# Patient Record
Sex: MALE | Race: WHITE | NOT HISPANIC OR LATINO | RURAL
[De-identification: names, ages, dates, MRNs, and addresses within clinical notes are randomized per-mention and may not be internally consistent; named-entity substitution may affect disease eponyms.]

---

## 2024-01-25 ENCOUNTER — HOSPITAL ENCOUNTER (EMERGENCY)
Facility: HOSPITAL | Age: 24
Discharge: HOME | End: 2024-01-25

## 2024-01-25 VITALS
HEIGHT: 70 IN | RESPIRATION RATE: 18 BRPM | WEIGHT: 159.5 LBS | DIASTOLIC BLOOD PRESSURE: 68 MMHG | SYSTOLIC BLOOD PRESSURE: 106 MMHG | BODY MASS INDEX: 22.83 KG/M2 | HEART RATE: 97 BPM | OXYGEN SATURATION: 97 % | TEMPERATURE: 102 F

## 2024-01-25 DIAGNOSIS — U07.1 COVID-19: Primary | ICD-10-CM

## 2024-01-25 LAB
FLUAV AG UPPER RESP QL IA.RAPID: NEGATIVE
FLUBV AG UPPER RESP QL IA.RAPID: NEGATIVE
SARS-COV-2 RDRP RESP QL NAA+PROBE: POSITIVE

## 2024-01-25 PROCEDURE — 99283 EMERGENCY DEPT VISIT LOW MDM: CPT | Mod: ,,, | Performed by: NURSE PRACTITIONER

## 2024-01-25 PROCEDURE — 99282 EMERGENCY DEPT VISIT SF MDM: CPT

## 2024-01-25 PROCEDURE — 87804 INFLUENZA ASSAY W/OPTIC: CPT | Performed by: NURSE PRACTITIONER

## 2024-01-25 PROCEDURE — 87635 SARS-COV-2 COVID-19 AMP PRB: CPT | Performed by: NURSE PRACTITIONER

## 2024-01-25 NOTE — ED TRIAGE NOTES
Presents to ER with c/o fever and body aches. Pt states that he woke up with a temp of 101.7 around 1500, took two motrin and went to work. Pt now aching all over rating pain 5/10. Temp 101.8.

## 2024-01-25 NOTE — ED PROVIDER NOTES
Encounter Date: 1/25/2024       History     Chief Complaint   Patient presents with    Generalized Body Aches    Fever     Fever and body aches    The history is provided by the patient.     Review of patient's allergies indicates:  No Known Allergies  History reviewed. No pertinent past medical history.  History reviewed. No pertinent surgical history.  History reviewed. No pertinent family history.  Social History     Tobacco Use    Smoking status: Never    Smokeless tobacco: Never   Substance Use Topics    Alcohol use: Never    Drug use: Never     Review of Systems   Constitutional:  Positive for fever.   Musculoskeletal:  Positive for myalgias.   All other systems reviewed and are negative.      Physical Exam     Initial Vitals [01/25/24 0230]   BP Pulse Resp Temp SpO2   106/68 97 18 (!) 101.8 °F (38.8 °C) 97 %      MAP       --         Physical Exam    Constitutional: He appears well-developed and well-nourished.   HENT:   Head: Normocephalic.   Right Ear: External ear normal.   Left Ear: External ear normal.   Mouth/Throat: Oropharynx is clear and moist.   Neck:   Normal range of motion.  Cardiovascular:  Normal rate and regular rhythm.           Pulmonary/Chest: Breath sounds normal.   Abdominal: Abdomen is soft.   Musculoskeletal:      Cervical back: Normal range of motion.     Neurological: He is alert and oriented to person, place, and time.   Skin: Skin is warm.   Psychiatric: He has a normal mood and affect.         Medical Screening Exam   See Full Note    ED Course   Procedures  Labs Reviewed   SARS-COV-2 RNA AMPLIFICATION, QUAL - Abnormal; Notable for the following components:       Result Value    SARS COV-2 MOLECULAR Positive (*)     All other components within normal limits   RAPID INFLUENZA A/B - Normal          Imaging Results    None          Medications - No data to display  Medical Decision Making  Fever and body aches today                                      Clinical Impression:   Final  diagnoses:  [U07.1] COVID-19 (Primary)        ED Disposition Condition    Discharge Stable          ED Prescriptions    None       Follow-up Information       Follow up With Specialties Details Why Contact Info    pcp   As needed              Jovani Marrero, DGP  01/25/24 0253

## 2024-01-25 NOTE — Clinical Note
"Venancio"Tyler Farfan was seen and treated in our emergency department on 1/25/2024.  He may return to work on 01/30/2024.       If you have any questions or concerns, please don't hesitate to call.      Jovani Marrero, FNP"